# Patient Record
Sex: FEMALE | Race: WHITE | NOT HISPANIC OR LATINO | URBAN - NONMETROPOLITAN AREA
[De-identification: names, ages, dates, MRNs, and addresses within clinical notes are randomized per-mention and may not be internally consistent; named-entity substitution may affect disease eponyms.]

---

## 2025-04-28 ENCOUNTER — APPOINTMENT (OUTPATIENT)
Dept: CARDIOLOGY | Facility: CLINIC | Age: 65
End: 2025-04-28
Payer: MEDICARE

## 2025-04-28 VITALS
HEIGHT: 62 IN | SYSTOLIC BLOOD PRESSURE: 130 MMHG | DIASTOLIC BLOOD PRESSURE: 80 MMHG | BODY MASS INDEX: 36.25 KG/M2 | HEART RATE: 68 BPM | WEIGHT: 197 LBS

## 2025-04-28 DIAGNOSIS — C50.919 HER2-POSITIVE CARCINOMA OF BREAST: Primary | ICD-10-CM

## 2025-04-28 DIAGNOSIS — Z17.31 HER2-POSITIVE CARCINOMA OF BREAST: Primary | ICD-10-CM

## 2025-04-28 DIAGNOSIS — I10 ESSENTIAL HYPERTENSION: ICD-10-CM

## 2025-04-28 DIAGNOSIS — E78.2 MIXED HYPERLIPIDEMIA: ICD-10-CM

## 2025-04-28 DIAGNOSIS — E11.49 TYPE II DIABETES MELLITUS WITH NEUROLOGICAL MANIFESTATIONS (MULTI): ICD-10-CM

## 2025-04-28 PROCEDURE — 1036F TOBACCO NON-USER: CPT | Performed by: NURSE PRACTITIONER

## 2025-04-28 PROCEDURE — 3008F BODY MASS INDEX DOCD: CPT | Performed by: NURSE PRACTITIONER

## 2025-04-28 PROCEDURE — 99213 OFFICE O/P EST LOW 20 MIN: CPT | Performed by: NURSE PRACTITIONER

## 2025-04-28 PROCEDURE — 4010F ACE/ARB THERAPY RXD/TAKEN: CPT | Performed by: NURSE PRACTITIONER

## 2025-04-28 PROCEDURE — 3075F SYST BP GE 130 - 139MM HG: CPT | Performed by: NURSE PRACTITIONER

## 2025-04-28 PROCEDURE — 3079F DIAST BP 80-89 MM HG: CPT | Performed by: NURSE PRACTITIONER

## 2025-04-28 RX ORDER — LOSARTAN POTASSIUM 100 MG/1
100 TABLET ORAL DAILY
COMMUNITY
Start: 2025-03-10

## 2025-04-28 RX ORDER — DICLOFENAC SODIUM 10 MG/G
2 GEL TOPICAL 4 TIMES DAILY PRN
COMMUNITY
Start: 2023-12-04

## 2025-04-28 RX ORDER — FAMOTIDINE 40 MG/1
40 TABLET, FILM COATED ORAL 2 TIMES DAILY PRN
COMMUNITY
Start: 2023-12-13

## 2025-04-28 RX ORDER — HYDROXYZINE PAMOATE 25 MG/1
25 CAPSULE ORAL 2 TIMES DAILY PRN
COMMUNITY
Start: 2025-03-04

## 2025-04-28 RX ORDER — ONDANSETRON 4 MG/1
1 TABLET, FILM COATED ORAL EVERY 8 HOURS PRN
COMMUNITY
Start: 2024-11-04

## 2025-04-28 RX ORDER — FERROUS SULFATE 324(65)MG
1 TABLET, DELAYED RELEASE (ENTERIC COATED) ORAL EVERY OTHER DAY
COMMUNITY
Start: 2024-12-09

## 2025-04-28 RX ORDER — CETIRIZINE HYDROCHLORIDE 10 MG/1
10 TABLET ORAL DAILY
COMMUNITY
Start: 2025-03-10

## 2025-04-28 RX ORDER — HYDROCHLOROTHIAZIDE 12.5 MG/1
12.5 TABLET ORAL DAILY
COMMUNITY
Start: 2025-03-10

## 2025-04-28 RX ORDER — CALCIUM CARBONATE/VITAMIN D3 600MG-5MCG
1 TABLET ORAL DAILY
COMMUNITY

## 2025-04-28 RX ORDER — BUPROPION HYDROCHLORIDE 150 MG/1
150 TABLET ORAL EVERY MORNING
COMMUNITY
Start: 2024-05-03

## 2025-04-28 RX ORDER — ANASTROZOLE 1 MG/1
1 TABLET ORAL DAILY
COMMUNITY
Start: 2023-10-22

## 2025-04-28 RX ORDER — ATORVASTATIN CALCIUM 10 MG/1
10 TABLET, FILM COATED ORAL DAILY
COMMUNITY
Start: 2025-03-10

## 2025-04-28 RX ORDER — BLACK COHOSH ROOT 540 MG
1 CAPSULE ORAL DAILY
COMMUNITY

## 2025-04-28 RX ORDER — FLUTICASONE PROPIONATE 50 MCG
2 SPRAY, SUSPENSION (ML) NASAL
COMMUNITY

## 2025-04-28 RX ORDER — RISPERIDONE 1 MG/1
1 TABLET ORAL 2 TIMES DAILY
COMMUNITY
Start: 2024-04-23

## 2025-04-28 RX ORDER — DICYCLOMINE HYDROCHLORIDE 20 MG/1
20 TABLET ORAL 3 TIMES DAILY
COMMUNITY
Start: 2024-01-26

## 2025-04-28 RX ORDER — ROPINIROLE 2 MG/1
2 TABLET, FILM COATED ORAL NIGHTLY
COMMUNITY
Start: 2025-03-10

## 2025-04-28 RX ORDER — OXYBUTYNIN CHLORIDE 5 MG/1
5 TABLET ORAL 2 TIMES DAILY
COMMUNITY

## 2025-04-28 RX ORDER — LINAGLIPTIN 5 MG/1
5 TABLET, FILM COATED ORAL DAILY
COMMUNITY

## 2025-04-28 RX ORDER — RISPERIDONE 0.5 MG/1
0.5 TABLET ORAL 2 TIMES DAILY
COMMUNITY

## 2025-04-28 RX ORDER — TRAZODONE HYDROCHLORIDE 100 MG/1
100 TABLET ORAL NIGHTLY
COMMUNITY
Start: 2023-05-31

## 2025-04-28 RX ORDER — OMEPRAZOLE 40 MG/1
1 CAPSULE, DELAYED RELEASE ORAL EVERY 12 HOURS
COMMUNITY

## 2025-04-28 RX ORDER — BUTYROSPERMUM PARKII(SHEA BUTTER), SIMMONDSIA CHINENSIS (JOJOBA) SEED OIL, ALOE BARBADENSIS LEAF EXTRACT .01; 1; 3.5 G/100G; G/100G; G/100G
250 LIQUID TOPICAL 2 TIMES DAILY
COMMUNITY

## 2025-04-28 RX ORDER — BUPROPION HYDROCHLORIDE 300 MG/1
300 TABLET ORAL EVERY MORNING
COMMUNITY
Start: 2025-01-05

## 2025-04-28 RX ORDER — CARVEDILOL 25 MG/1
25 TABLET ORAL 2 TIMES DAILY
COMMUNITY
Start: 2025-01-18

## 2025-04-28 ASSESSMENT — ENCOUNTER SYMPTOMS
ORTHOPNEA: 0
IRREGULAR HEARTBEAT: 0
DYSPNEA ON EXERTION: 0
PND: 0
PALPITATIONS: 0
SYNCOPE: 0
NEAR-SYNCOPE: 0

## 2025-04-28 NOTE — PROGRESS NOTES
"Chief Complaint  \" Doing fine\"    Reason for Visit  Patient presents to the office today for outpatient follow-up for hospital follow-up.  This is initial outpatient cardiology follow-up.    Presents today ambulatory with steady gait.    March 2025. Patient was recently hospitalized at [facility].  The patient was seen in Cardiology consult with subsequent cardiovascular management by [facility]  Hospitalization records have been reviewed.   Reason for Cardiology Consultation: Type II NSTEMI, htn urgency   Consulting Cardiologist: Dr. Duque   Cardiovascular testing: Cath angiographically normal coronaries, TTE LVEF 70%.  Changes to cardiovascular medical regimen at time of discharge: No changes  Discharge disposition: Home    History of Present Illness   Patient is a pleasant 62-year-old female who presents to the office today where left radial cath site is healed without adverse sequela.  She remains aerobically active walking her dog, doing ADLs and spring cleaning.  She denies any exertional chest pain, no dyspnea on exertion.  Reviewed recent cardiac cath showing angiographically normal coronaries and discussed echocardiogram showing normal LVEF and no structural abnormalities.  Discussed the importance of primary prevention.  Overall, she is pleased regarding results from recent cardiovascular testing.  At this time, can follow-up with cardiology on an as-needed basis.    Patient reports that overall has no complaint(s) of chest pain, chest pressure/discomfort, claudication, dyspnea, exertional chest pressure/discomfort, fatigue, irregular heart beat, and lower extremity edema    Daily activity:    > 4 METs  Denies any change in exercise capacity or functional tolerance since last office visit.    The importance of primary prevention reviewed:  HTN: Optimal  HLD: Treated  DM: Denies  Smoker: Denies  BMI:  Reviewed the merits of healthy lifestyle choices on overall cardiovascular " "health.    Fall screening reveals an occurrence over the course of the last year. Events were reviewed in detail with the patient and due to accidental trip       Review of Systems   Cardiovascular:  Negative for chest pain, dyspnea on exertion, irregular heartbeat, leg swelling, near-syncope, orthopnea, palpitations, paroxysmal nocturnal dyspnea and syncope.        Visit Vitals  /80 (BP Location: Left arm, Patient Position: Sitting)   Pulse 68   Ht 1.575 m (5' 2\")   Wt 89.4 kg (197 lb)   BMI 36.03 kg/m²   Smoking Status Former   BSA 1.98 m²     Physical Exam  Vitals and nursing note reviewed.   HENT:      Head: Normocephalic.   Cardiovascular:      Rate and Rhythm: Normal rate and regular rhythm.      Heart sounds: Normal heart sounds.   Pulmonary:      Effort: Pulmonary effort is normal.      Breath sounds: Normal breath sounds.   Abdominal:      Palpations: Abdomen is soft.   Musculoskeletal:      Right lower leg: No edema.      Left lower leg: No edema.   Skin:     General: Skin is warm and dry.   Neurological:      General: No focal deficit present.      Mental Status: She is alert.   Psychiatric:         Mood and Affect: Mood normal.         Behavior: Behavior normal.          ALLERGIES:  Hydrocodone-acetaminophen and Empagliflozin    Current Outpatient Medications   Medication Instructions    anastrozole (ARIMIDEX) 1 mg, Daily    atorvastatin (LIPITOR) 10 mg, Daily    black cohosh 540 mg capsule 1 capsule, Daily    buPROPion XL (WELLBUTRIN XL) 300 mg, Every morning    buPROPion XL (WELLBUTRIN XL) 150 mg, Every morning    calcium carbonate-vitamin D3 600 mg-5 mcg (200 unit) tablet 1 tablet, Daily    carvedilol (COREG) 25 mg, 2 times daily    cetirizine (ZYRTEC) 10 mg, Daily    diclofenac sodium (VOLTAREN) 2 g, 4 times daily PRN    dicyclomine (BENTYL) 20 mg, 3 times daily    famotidine (PEPCID) 40 mg, 2 times daily PRN    ferrous sulfate 324 mg (65 mg elemental iron) EC tablet (delayed release) 1 tablet, " Every other day    fluticasone (Flonase) 50 mcg/actuation nasal spray 2 sprays, Daily RT    hydroCHLOROthiazide (MICROZIDE) 12.5 mg, Daily    hydrOXYzine pamoate (VISTARIL) 25 mg, 2 times daily PRN    losartan (COZAAR) 100 mg, Daily    omeprazole (PriLOSEC) 40 mg DR capsule 1 capsule, Every 12 hours    ondansetron (Zofran) 4 mg tablet 1 tablet, Every 8 hours PRN    oxybutynin (DITROPAN) 5 mg, 2 times daily    risperiDONE (RISPERDAL) 1 mg, 2 times daily    risperiDONE (RISPERDAL) 0.5 mg, 2 times daily    rOPINIRole (REQUIP) 2 mg, Nightly    saccharomyces boulardii (FLORASTOR) 250 mg, 2 times daily    Tradjenta 5 mg, Daily    traZODone (DESYREL) 100 mg, Nightly      Assessment:    64-year-old female presented to the hospital with nausea and vomiting secondary to celiac disease.  Had trivially elevated troponins.  Had CTA chest negative for PE, uneventful cardiac cath with angiographically normal coronaries, LVEF 65%.  Felt to have type II NSTEMI due to hypertensive urgency.  No medication changes.  Echocardiogram without structural abnormalities.  In light of favorable testing including angiographically normal coronaries and normal LVEF, no structural abnormality and no history of dysrhythmia patient can follow with cardiology on an as-needed basis, primary prevention optimally addressed by PCP.    Plan:     Through informed decision making process incorporating patients unique circumstances, the following treatment plan will be initiated:    1.  Prescription drug management of cardiovascular medication for efficacy, adherence to treatment, side effect assessment and polypharmacy. Current treatment clinically warranted and to continue without modifications.    2. Return for follow-up; in the interim, contact the office if new symptoms arise.  Cardiology as needed       Gayathri Ye MSN, APRN-CNP, PMHNP-[facility]  [place], [place]     Please excuse any errors in  grammar or translation related to this dictation. Voice recognition software was utilized to prepare this document.

## 2025-04-28 NOTE — PATIENT INSTRUCTIONS
Please bring all medicines, vitamins, and herbal supplements with you when you come to the office.    Prescriptions will not be filled unless you are compliant with your follow up appointments or have a follow up appointment scheduled as per instruction of your physician. Refills should be requested at the time of your visit.     Fall Prevention Education Given     PLAN:   Through informed decision making process incorporating patients unique circumstances, the following treatment plan will be initiated:    1.  Prescription drug management of cardiovascular medication for efficacy, adherence to treatment, side effect assessment and polypharmacy. Current treatment clinically warranted and to continue without modifications.    2. Return for follow-up; in the interim, contact the office if new symptoms arise.  Cardiology as needed           Ways to Help Prevent Falls at Home    Quick Tips   ? Ask for help if you need it. Most people want to help!   ? Get up slowly after sitting or laying down   ? Wear a medical alert device or keep cell phone in your pocket   ? Use night lights, especially areas near a bathroom   ? Keep the items you use often within reach on a small stool or end table   ? Use an assistive device such as walker or cane, as directed by provider/physical therapy   ? Use a non-slip mat and grab bars in your bathroom. Look for home health sections for best options     Other Areas to Focus On   ? Exercise and nutrition: Regular exercise or taking a falls prevention class are great ways improve strength and balance. Don’t forget to stay hydrated and bring a snack!   ? Medicine side effects: Some medicines can make you sleepy or dizzy, which could cause a fall. Ask your healthcare provider about the side effects your medicines could cause. Be sure to let them know if you take any vitamins or supplements as well.   ? Tripping hazards: Remove items you could trip on, such as loose mats, rugs, cords, and  clutter. Wear closed toe shoes with rubber soles.   ? Health and wellness: Get regular checkups with your healthcare provider, plus routine vision and hearing screenings. Talk with your healthcare provider about:   o Your medicines and the possible side effects - bring them in a bag if that is easier!   o Problems with balance or feeling dizzy   o Ways to promote bone health, such as Vitamin D and calcium supplements   o Questions or concerns about falling